# Patient Record
Sex: FEMALE
[De-identification: names, ages, dates, MRNs, and addresses within clinical notes are randomized per-mention and may not be internally consistent; named-entity substitution may affect disease eponyms.]

---

## 2019-01-16 ENCOUNTER — RX RENEWAL (OUTPATIENT)
Age: 44
End: 2019-01-16

## 2019-08-16 ENCOUNTER — RX RENEWAL (OUTPATIENT)
Age: 44
End: 2019-08-16

## 2019-08-21 ENCOUNTER — RX RENEWAL (OUTPATIENT)
Age: 44
End: 2019-08-21

## 2019-08-23 ENCOUNTER — RX RENEWAL (OUTPATIENT)
Age: 44
End: 2019-08-23

## 2019-08-26 ENCOUNTER — RX RENEWAL (OUTPATIENT)
Age: 44
End: 2019-08-26

## 2019-10-10 ENCOUNTER — RECORD ABSTRACTING (OUTPATIENT)
Age: 44
End: 2019-10-10

## 2019-10-10 DIAGNOSIS — Z83.3 FAMILY HISTORY OF DIABETES MELLITUS: ICD-10-CM

## 2019-10-10 DIAGNOSIS — Z82.49 FAMILY HISTORY OF ISCHEMIC HEART DISEASE AND OTHER DISEASES OF THE CIRCULATORY SYSTEM: ICD-10-CM

## 2019-10-10 LAB — CYTOLOGY CVX/VAG DOC THIN PREP: NORMAL

## 2019-12-04 ENCOUNTER — APPOINTMENT (OUTPATIENT)
Dept: INTERNAL MEDICINE | Facility: CLINIC | Age: 44
End: 2019-12-04
Payer: COMMERCIAL

## 2019-12-04 ENCOUNTER — NON-APPOINTMENT (OUTPATIENT)
Age: 44
End: 2019-12-04

## 2019-12-04 VITALS
HEIGHT: 63 IN | SYSTOLIC BLOOD PRESSURE: 120 MMHG | WEIGHT: 132 LBS | DIASTOLIC BLOOD PRESSURE: 74 MMHG | BODY MASS INDEX: 23.39 KG/M2

## 2019-12-04 DIAGNOSIS — Z12.31 ENCOUNTER FOR SCREENING MAMMOGRAM FOR MALIGNANT NEOPLASM OF BREAST: ICD-10-CM

## 2019-12-04 PROCEDURE — 36415 COLL VENOUS BLD VENIPUNCTURE: CPT

## 2019-12-04 PROCEDURE — 93000 ELECTROCARDIOGRAM COMPLETE: CPT

## 2019-12-04 PROCEDURE — 99396 PREV VISIT EST AGE 40-64: CPT | Mod: 25

## 2019-12-04 RX ORDER — LEVOTHYROXINE SODIUM 25 UG/1
25 TABLET ORAL
Refills: 0 | Status: DISCONTINUED | COMMUNITY
End: 2019-12-04

## 2019-12-04 RX ORDER — LEVOTHYROXINE SODIUM 0.03 MG/1
25 TABLET ORAL
Qty: 30 | Refills: 1 | Status: DISCONTINUED | COMMUNITY
Start: 2019-01-16 | End: 2019-12-04

## 2019-12-04 NOTE — HEALTH RISK ASSESSMENT
[Very Good] : ~his/her~  mood as very good [No falls in past year] : Patient reported no falls in the past year [No] : No [0] : 2) Feeling down, depressed, or hopeless: Not at all (0) [Patient reported mammogram was normal] : Patient reported mammogram was normal [Patient reported PAP Smear was normal] : Patient reported PAP Smear was normal [Patient declined bone density test] : Patient declined bone density test [Patient declined colonoscopy] : Patient declined colonoscopy [HIV Test offered] : HIV Test offered [Hepatitis C test declined] : Hepatitis C test declined [] : No [[id number]] : 0 [MammogramDate] : 07/17 [PapSmearDate] : 07/17

## 2019-12-04 NOTE — PLAN
[FreeTextEntry1] : 44-year-old female presents for annual exam, exercises regularly without difficulty, denies chest pain shortness of breath palpitations dizziness.\par \par Refuses flu vaccine\par \par Referred to gastroenterologist for discussion of colonoscopy\par \par Due for gynecologist and mammogram, prescription given\par \par Call one week to review labs

## 2019-12-04 NOTE — HISTORY OF PRESENT ILLNESS
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 Exercising regularly without difficulty, denies chest pain shortness breath palpitations dizziness.\par \par Concerned about colon cancer, good friend recently  at age 44, referred to gastroenterologist about need for colonoscopy.\par \par Due for a gynecologist and mammogram. [FreeTextEntry1] : Annual exam

## 2019-12-04 NOTE — PHYSICAL EXAM
[Normal Female:] : bladder was normal on palpation [Normal] : normal gait, coordination grossly intact, no focal deficits [de-identified] : Deferred GYN; Denies pain, lumps and discharge [de-identified] : No lymphadenopathy [FreeTextEntry1] : Deferred GI/GYN [de-identified] : Denies excessive thirst, urination, fatigue [de-identified] : No rash or skin lesion [de-identified] : Alert and Oriented x3.  Appropriate mood and affect

## 2019-12-05 LAB
ALBUMIN SERPL ELPH-MCNC: 4.8 G/DL
ALP BLD-CCNC: 54 U/L
ALT SERPL-CCNC: 23 U/L
ANION GAP SERPL CALC-SCNC: 16 MMOL/L
APPEARANCE: CLEAR
AST SERPL-CCNC: 29 U/L
BASOPHILS # BLD AUTO: 0.02 K/UL
BASOPHILS NFR BLD AUTO: 0.3 %
BILIRUB SERPL-MCNC: 0.4 MG/DL
BILIRUBIN URINE: NEGATIVE
BLOOD URINE: NORMAL
BUN SERPL-MCNC: 12 MG/DL
CALCIUM SERPL-MCNC: 10.2 MG/DL
CHLORIDE SERPL-SCNC: 101 MMOL/L
CHOLEST SERPL-MCNC: 251 MG/DL
CHOLEST/HDLC SERPL: 3.5 RATIO
CO2 SERPL-SCNC: 24 MMOL/L
COLOR: YELLOW
CREAT SERPL-MCNC: 0.73 MG/DL
EOSINOPHIL # BLD AUTO: 0.04 K/UL
EOSINOPHIL NFR BLD AUTO: 0.6 %
FOLATE SERPL-MCNC: 12.5 NG/ML
GLUCOSE QUALITATIVE U: NEGATIVE
GLUCOSE SERPL-MCNC: 93 MG/DL
HCT VFR BLD CALC: 42.9 %
HDLC SERPL-MCNC: 72 MG/DL
HGB BLD-MCNC: 14.5 G/DL
HIV1+2 AB SPEC QL IA.RAPID: NONREACTIVE
IMM GRANULOCYTES NFR BLD AUTO: 0.2 %
KETONES URINE: NEGATIVE
LDLC SERPL CALC-MCNC: 148 MG/DL
LEUKOCYTE ESTERASE URINE: NEGATIVE
LYMPHOCYTES # BLD AUTO: 1.4 K/UL
LYMPHOCYTES NFR BLD AUTO: 22.1 %
MAN DIFF?: NORMAL
MCHC RBC-ENTMCNC: 31.3 PG
MCHC RBC-ENTMCNC: 33.8 GM/DL
MCV RBC AUTO: 92.5 FL
MONOCYTES # BLD AUTO: 0.46 K/UL
MONOCYTES NFR BLD AUTO: 7.3 %
NEUTROPHILS # BLD AUTO: 4.41 K/UL
NEUTROPHILS NFR BLD AUTO: 69.5 %
NITRITE URINE: NEGATIVE
PH URINE: 7
PLATELET # BLD AUTO: 267 K/UL
POTASSIUM SERPL-SCNC: 4 MMOL/L
PROT SERPL-MCNC: 7.1 G/DL
PROTEIN URINE: NEGATIVE
RBC # BLD: 4.64 M/UL
RBC # FLD: 12.9 %
SODIUM SERPL-SCNC: 141 MMOL/L
SPECIFIC GRAVITY URINE: 1.01
T4 FREE SERPL-MCNC: 0.9 NG/DL
TRIGL SERPL-MCNC: 156 MG/DL
TSH SERPL-ACNC: 7.58 UIU/ML
UROBILINOGEN URINE: NORMAL
VIT B12 SERPL-MCNC: 1017 PG/ML
WBC # FLD AUTO: 6.34 K/UL

## 2021-01-21 ENCOUNTER — APPOINTMENT (OUTPATIENT)
Dept: INTERNAL MEDICINE | Facility: CLINIC | Age: 46
End: 2021-01-21
Payer: COMMERCIAL

## 2021-01-21 ENCOUNTER — LABORATORY RESULT (OUTPATIENT)
Age: 46
End: 2021-01-21

## 2021-01-21 VITALS
TEMPERATURE: 97.8 F | WEIGHT: 133 LBS | BODY MASS INDEX: 23.57 KG/M2 | DIASTOLIC BLOOD PRESSURE: 70 MMHG | SYSTOLIC BLOOD PRESSURE: 134 MMHG | HEIGHT: 63 IN

## 2021-01-21 DIAGNOSIS — Z00.00 ENCOUNTER FOR GENERAL ADULT MEDICAL EXAMINATION W/OUT ABNORMAL FINDINGS: ICD-10-CM

## 2021-01-21 DIAGNOSIS — G25.81 RESTLESS LEGS SYNDROME: ICD-10-CM

## 2021-01-21 PROCEDURE — 36415 COLL VENOUS BLD VENIPUNCTURE: CPT

## 2021-01-21 PROCEDURE — 99072 ADDL SUPL MATRL&STAF TM PHE: CPT

## 2021-01-21 PROCEDURE — 99396 PREV VISIT EST AGE 40-64: CPT | Mod: 25

## 2021-01-21 NOTE — PLAN
[FreeTextEntry1] : 45-year-old female as noted presents for annual exam, feels well. Denies chest pain shortness of breath palpitations dizziness\par Discussed diagnosis of restless leg syndrome and as noted patient refuses medication at this time\par \par Has an appointment next month with her endocrinologist for followup thyroid nodule.\par \par Scheduled for GYN and mammogram\par \par Referred to GI for colonoscopy, she will check with her insurance for coverage before age 50. No family history of colon cancer\par Urine culture done today\par Call next week to review labs

## 2021-01-21 NOTE — HEALTH RISK ASSESSMENT
[Very Good] : ~his/her~  mood as very good [No] : No [No falls in past year] : Patient reported no falls in the past year [0] : 2) Feeling down, depressed, or hopeless: Not at all (0) [Patient reported mammogram was normal] : Patient reported mammogram was normal [Patient reported PAP Smear was normal] : Patient reported PAP Smear was normal [Patient declined bone density test] : Patient declined bone density test [Patient declined colonoscopy] : Patient declined colonoscopy [HIV test declined] : HIV test declined [Hepatitis C test declined] : Hepatitis C test declined [] : No [[id number]] : 0 [MammogramDate] : 07/17 [MammogramComments] : Will follow up with Dr. Luna [PapSmearDate] : 07/17 [PapSmearComments] : Will follow up with Dr. Luna

## 2021-01-21 NOTE — HISTORY OF PRESENT ILLNESS
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 Exercises regularly without difficulty, denies chest pain shortness of breath palpitations dizziness.Only complaint is that she has a creepy crawly feeling in her left leg in the evening and at bedtime, not keeping her awake. Discussed symptoms at length as well as medication which at this time patient refuses\par Complaining of urinary frequency and urgency 2 days ago, symptoms have improved somewhat but patient is concerned about UTI\par Up to date with screenings

## 2021-01-21 NOTE — PHYSICAL EXAM
[Normal Female:] : bladder was normal on palpation [Normal] : normal gait, coordination grossly intact, no focal deficits [de-identified] : Deferred GYN; Denies pain, lumps and discharge [FreeTextEntry1] : Deferred GI/GYN [de-identified] : No lymphadenopathy [de-identified] : Denies excessive thirst, urination, fatigue [de-identified] : No rash or skin lesion [de-identified] : Alert and Oriented x3.  Appropriate mood and affect

## 2021-01-22 LAB
ALBUMIN SERPL ELPH-MCNC: 4.8 G/DL
ALP BLD-CCNC: 69 U/L
ALT SERPL-CCNC: 19 U/L
ANION GAP SERPL CALC-SCNC: 13 MMOL/L
APPEARANCE: CLEAR
AST SERPL-CCNC: 25 U/L
BASOPHILS # BLD AUTO: 0.02 K/UL
BASOPHILS NFR BLD AUTO: 0.4 %
BILIRUB SERPL-MCNC: 0.2 MG/DL
BILIRUBIN URINE: NEGATIVE
BLOOD URINE: NEGATIVE
BUN SERPL-MCNC: 13 MG/DL
CALCIUM SERPL-MCNC: 10.3 MG/DL
CHLORIDE SERPL-SCNC: 102 MMOL/L
CHOLEST SERPL-MCNC: 224 MG/DL
CO2 SERPL-SCNC: 27 MMOL/L
COLOR: COLORLESS
CREAT SERPL-MCNC: 0.83 MG/DL
EOSINOPHIL # BLD AUTO: 0.07 K/UL
EOSINOPHIL NFR BLD AUTO: 1.4 %
FOLATE SERPL-MCNC: 7.8 NG/ML
GLUCOSE QUALITATIVE U: NEGATIVE
GLUCOSE SERPL-MCNC: 76 MG/DL
HCT VFR BLD CALC: 43.1 %
HDLC SERPL-MCNC: 80 MG/DL
HGB BLD-MCNC: 13.9 G/DL
IMM GRANULOCYTES NFR BLD AUTO: 0.4 %
KETONES URINE: NEGATIVE
LDLC SERPL CALC-MCNC: 127 MG/DL
LEUKOCYTE ESTERASE URINE: NEGATIVE
LYMPHOCYTES # BLD AUTO: 1.34 K/UL
LYMPHOCYTES NFR BLD AUTO: 26.5 %
MAN DIFF?: NORMAL
MCHC RBC-ENTMCNC: 31.4 PG
MCHC RBC-ENTMCNC: 32.3 GM/DL
MCV RBC AUTO: 97.3 FL
MONOCYTES # BLD AUTO: 0.45 K/UL
MONOCYTES NFR BLD AUTO: 8.9 %
NEUTROPHILS # BLD AUTO: 3.16 K/UL
NEUTROPHILS NFR BLD AUTO: 62.4 %
NITRITE URINE: NEGATIVE
NONHDLC SERPL-MCNC: 144 MG/DL
PH URINE: 7
PLATELET # BLD AUTO: 287 K/UL
POTASSIUM SERPL-SCNC: 4 MMOL/L
PROT SERPL-MCNC: 7.1 G/DL
PROTEIN URINE: NEGATIVE
RBC # BLD: 4.43 M/UL
RBC # FLD: 13.2 %
SODIUM SERPL-SCNC: 142 MMOL/L
SPECIFIC GRAVITY URINE: 1
T4 FREE SERPL-MCNC: 0.9 NG/DL
TRIGL SERPL-MCNC: 83 MG/DL
TSH SERPL-ACNC: 5.2 UIU/ML
UROBILINOGEN URINE: NORMAL
VIT B12 SERPL-MCNC: 983 PG/ML
WBC # FLD AUTO: 5.06 K/UL

## 2021-01-23 LAB — BACTERIA UR CULT: NORMAL

## 2021-01-27 DIAGNOSIS — E03.9 HYPOTHYROIDISM, UNSPECIFIED: ICD-10-CM

## 2021-02-12 ENCOUNTER — TRANSCRIPTION ENCOUNTER (OUTPATIENT)
Age: 46
End: 2021-02-12

## 2021-02-16 ENCOUNTER — APPOINTMENT (OUTPATIENT)
Dept: OBGYN | Facility: CLINIC | Age: 46
End: 2021-02-16
Payer: COMMERCIAL

## 2021-02-16 VITALS
WEIGHT: 132 LBS | TEMPERATURE: 97.7 F | DIASTOLIC BLOOD PRESSURE: 60 MMHG | HEIGHT: 63 IN | BODY MASS INDEX: 23.39 KG/M2 | SYSTOLIC BLOOD PRESSURE: 138 MMHG

## 2021-02-16 DIAGNOSIS — R92.2 INCONCLUSIVE MAMMOGRAM: ICD-10-CM

## 2021-02-16 DIAGNOSIS — Z01.419 ENCOUNTER FOR GYNECOLOGICAL EXAMINATION (GENERAL) (ROUTINE) W/OUT ABNORMAL FINDINGS: ICD-10-CM

## 2021-02-16 DIAGNOSIS — Z12.11 ENCOUNTER FOR SCREENING FOR MALIGNANT NEOPLASM OF COLON: ICD-10-CM

## 2021-02-16 PROCEDURE — 99386 PREV VISIT NEW AGE 40-64: CPT

## 2021-02-16 PROCEDURE — 99072 ADDL SUPL MATRL&STAF TM PHE: CPT

## 2021-02-16 NOTE — HISTORY OF PRESENT ILLNESS
[TextBox_4] : 45 year old P1 for annual exam. Periods coming at somewhat irregular intervals, sometimes early and sometimes late. No complaints. Using condoms for contraception. No history of cervical dysplasia. Due for mammogram and colon ca screening.

## 2021-02-16 NOTE — PLAN
[FreeTextEntry1] : Rx mammogram, breast US and GI referral given. Pap done today. Return in one year or sooner PRN.

## 2021-02-23 LAB
CYTOLOGY CVX/VAG DOC THIN PREP: ABNORMAL
HPV HIGH+LOW RISK DNA PNL CVX: NOT DETECTED

## 2021-09-01 ENCOUNTER — NON-APPOINTMENT (OUTPATIENT)
Age: 46
End: 2021-09-01

## 2021-09-08 ENCOUNTER — APPOINTMENT (OUTPATIENT)
Dept: INTERNAL MEDICINE | Facility: CLINIC | Age: 46
End: 2021-09-08
Payer: COMMERCIAL

## 2021-09-08 VITALS
TEMPERATURE: 97.9 F | DIASTOLIC BLOOD PRESSURE: 70 MMHG | HEIGHT: 63 IN | WEIGHT: 130 LBS | BODY MASS INDEX: 23.04 KG/M2 | SYSTOLIC BLOOD PRESSURE: 100 MMHG

## 2021-09-08 DIAGNOSIS — R59.0 LOCALIZED ENLARGED LYMPH NODES: ICD-10-CM

## 2021-09-08 PROCEDURE — 99213 OFFICE O/P EST LOW 20 MIN: CPT

## 2021-09-08 NOTE — REVIEW OF SYSTEMS
[Swollen Glands] : swollen glands [Negative] : Psychiatric [Easy Bleeding] : no easy bleeding [Easy Bruising] : no easy bruising [de-identified] : Lump right axilla

## 2021-09-08 NOTE — HISTORY OF PRESENT ILLNESS
[FreeTextEntry8] : 46-year-old female presents complaining of feeling a lump that was slightly tender right axilla several days ago which appeared at about the time she got her period. No redness or drainage. Lump resolved in a couple of days and now she can't feel anything. Mammogram is up-to-date

## 2021-09-08 NOTE — REVIEW OF SYSTEMS
[Swollen Glands] : swollen glands [Negative] : Psychiatric [Easy Bleeding] : no easy bleeding [Easy Bruising] : no easy bruising [de-identified] : Lump right axilla

## 2022-03-31 ENCOUNTER — APPOINTMENT (OUTPATIENT)
Dept: INTERNAL MEDICINE | Facility: CLINIC | Age: 47
End: 2022-03-31
Payer: COMMERCIAL

## 2022-03-31 VITALS
SYSTOLIC BLOOD PRESSURE: 98 MMHG | TEMPERATURE: 98.2 F | BODY MASS INDEX: 23.04 KG/M2 | WEIGHT: 130 LBS | DIASTOLIC BLOOD PRESSURE: 70 MMHG | HEIGHT: 63 IN

## 2022-03-31 PROCEDURE — 99213 OFFICE O/P EST LOW 20 MIN: CPT | Mod: 25

## 2022-03-31 PROCEDURE — 36415 COLL VENOUS BLD VENIPUNCTURE: CPT

## 2022-03-31 RX ORDER — CHOLECALCIFEROL (VITAMIN D3) 1250 MCG
1.25 MG CAPSULE ORAL
Refills: 0 | Status: ACTIVE | COMMUNITY

## 2022-03-31 NOTE — REVIEW OF SYSTEMS
[Abdominal Pain] : abdominal pain [Negative] : Heme/Lymph [Nausea] : no nausea [Constipation] : no constipation [Diarrhea] : diarrhea [Vomiting] : no vomiting [Heartburn] : no heartburn [Melena] : no melena [FreeTextEntry7] : Right mid

## 2022-03-31 NOTE — PHYSICAL EXAM
[No Acute Distress] : no acute distress [Well Nourished] : well nourished [Well Developed] : well developed [Well-Appearing] : well-appearing [Normal Sclera/Conjunctiva] : normal sclera/conjunctiva [PERRL] : pupils equal round and reactive to light [EOMI] : extraocular movements intact [Normal Outer Ear/Nose] : the outer ears and nose were normal in appearance [Normal Oropharynx] : the oropharynx was normal [No JVD] : no jugular venous distention [No Lymphadenopathy] : no lymphadenopathy [Supple] : supple [Thyroid Normal, No Nodules] : the thyroid was normal and there were no nodules present [No Respiratory Distress] : no respiratory distress  [No Accessory Muscle Use] : no accessory muscle use [Clear to Auscultation] : lungs were clear to auscultation bilaterally [Normal Rate] : normal rate  [Regular Rhythm] : with a regular rhythm [Normal S1, S2] : normal S1 and S2 [No Murmur] : no murmur heard [No Carotid Bruits] : no carotid bruits [No Abdominal Bruit] : a ~M bruit was not heard ~T in the abdomen [No Varicosities] : no varicosities [Pedal Pulses Present] : the pedal pulses are present [No Edema] : there was no peripheral edema [No Palpable Aorta] : no palpable aorta [No Extremity Clubbing/Cyanosis] : no extremity clubbing/cyanosis [Soft] : abdomen soft [Non Tender] : non-tender [Non-distended] : non-distended [No Masses] : no abdominal mass palpated [No HSM] : no HSM [Normal Bowel Sounds] : normal bowel sounds [No Hernias] : no hernias [Normal Posterior Cervical Nodes] : no posterior cervical lymphadenopathy [Normal Anterior Cervical Nodes] : no anterior cervical lymphadenopathy [No CVA Tenderness] : no CVA  tenderness [No Spinal Tenderness] : no spinal tenderness [No Joint Swelling] : no joint swelling [Grossly Normal Strength/Tone] : grossly normal strength/tone [No Rash] : no rash [Coordination Grossly Intact] : coordination grossly intact [No Focal Deficits] : no focal deficits [Normal Gait] : normal gait [Deep Tendon Reflexes (DTR)] : deep tendon reflexes were 2+ and symmetric [Normal Affect] : the affect was normal [Normal Insight/Judgement] : insight and judgment were intact

## 2022-03-31 NOTE — PLAN
[FreeTextEntry1] : 46-year-old female presents with symptoms consistent with muscle strain.  Lab work will be done today, patient will follow up tomorrow and she will monitor her symptoms and refrain from straining her abs when lifting weights etc.

## 2022-03-31 NOTE — HISTORY OF PRESENT ILLNESS
[FreeTextEntry8] : 46-year-old female presents complaining of right mid abdominal pain 2 weeks.  She eats a very healthy diet exercises regularly, bowel movements are regular.  Denies any other signs symptoms i.e. nausea, reflux.  Appetite good.  She has been training for the physical fitness test for the [facility] office and symptoms are consistent with multiple but she is very concerned that something may be wrong, lab work will be done today

## 2022-04-01 LAB
ALBUMIN SERPL ELPH-MCNC: 4.5 G/DL
ALP BLD-CCNC: 49 U/L
ALT SERPL-CCNC: 16 U/L
AMYLASE/CREAT SERPL: 52 U/L
ANION GAP SERPL CALC-SCNC: 14 MMOL/L
AST SERPL-CCNC: 24 U/L
BASOPHILS # BLD AUTO: 0.03 K/UL
BASOPHILS NFR BLD AUTO: 0.4 %
BILIRUB SERPL-MCNC: 0.4 MG/DL
BUN SERPL-MCNC: 16 MG/DL
CALCIUM SERPL-MCNC: 10.1 MG/DL
CHLORIDE SERPL-SCNC: 103 MMOL/L
CO2 SERPL-SCNC: 26 MMOL/L
CREAT SERPL-MCNC: 0.69 MG/DL
EGFR: 108 ML/MIN/1.73M2
EOSINOPHIL # BLD AUTO: 0.04 K/UL
EOSINOPHIL NFR BLD AUTO: 0.5 %
GLUCOSE SERPL-MCNC: 83 MG/DL
HCT VFR BLD CALC: 41.5 %
HGB BLD-MCNC: 13.5 G/DL
IMM GRANULOCYTES NFR BLD AUTO: 0.3 %
LPL SERPL-CCNC: 27 U/L
LYMPHOCYTES # BLD AUTO: 1.56 K/UL
LYMPHOCYTES NFR BLD AUTO: 20.5 %
MAN DIFF?: NORMAL
MCHC RBC-ENTMCNC: 31.7 PG
MCHC RBC-ENTMCNC: 32.5 GM/DL
MCV RBC AUTO: 97.4 FL
MONOCYTES # BLD AUTO: 0.63 K/UL
MONOCYTES NFR BLD AUTO: 8.3 %
NEUTROPHILS # BLD AUTO: 5.34 K/UL
NEUTROPHILS NFR BLD AUTO: 70 %
PLATELET # BLD AUTO: 263 K/UL
POTASSIUM SERPL-SCNC: 3.7 MMOL/L
PROT SERPL-MCNC: 6.7 G/DL
RBC # BLD: 4.26 M/UL
RBC # FLD: 13 %
SODIUM SERPL-SCNC: 143 MMOL/L
WBC # FLD AUTO: 7.62 K/UL

## 2023-06-12 ENCOUNTER — APPOINTMENT (OUTPATIENT)
Dept: INTERNAL MEDICINE | Facility: CLINIC | Age: 48
End: 2023-06-12
Payer: COMMERCIAL

## 2023-06-12 ENCOUNTER — LABORATORY RESULT (OUTPATIENT)
Age: 48
End: 2023-06-12

## 2023-06-12 VITALS
SYSTOLIC BLOOD PRESSURE: 112 MMHG | WEIGHT: 131 LBS | RESPIRATION RATE: 16 BRPM | OXYGEN SATURATION: 97 % | BODY MASS INDEX: 23.21 KG/M2 | HEART RATE: 77 BPM | HEIGHT: 63 IN | DIASTOLIC BLOOD PRESSURE: 76 MMHG

## 2023-06-12 DIAGNOSIS — E04.1 NONTOXIC SINGLE THYROID NODULE: ICD-10-CM

## 2023-06-12 DIAGNOSIS — Z77.21 CONTACT WITH AND (SUSPECTED) EXPOSURE TO POTENTIALLY HAZARDOUS BODY FLUIDS: ICD-10-CM

## 2023-06-12 DIAGNOSIS — Z00.00 ENCOUNTER FOR GENERAL ADULT MEDICAL EXAMINATION W/OUT ABNORMAL FINDINGS: ICD-10-CM

## 2023-06-12 DIAGNOSIS — Z87.898 PERSONAL HISTORY OF OTHER SPECIFIED CONDITIONS: ICD-10-CM

## 2023-06-12 DIAGNOSIS — S39.011A STRAIN OF MUSCLE, FASCIA AND TENDON OF ABDOMEN, INITIAL ENCOUNTER: ICD-10-CM

## 2023-06-12 DIAGNOSIS — R01.1 CARDIAC MURMUR, UNSPECIFIED: ICD-10-CM

## 2023-06-12 DIAGNOSIS — Z79.899 OTHER LONG TERM (CURRENT) DRUG THERAPY: ICD-10-CM

## 2023-06-12 PROCEDURE — 99214 OFFICE O/P EST MOD 30 MIN: CPT | Mod: 25

## 2023-06-12 PROCEDURE — G0444 DEPRESSION SCREEN ANNUAL: CPT | Mod: 59

## 2023-06-12 PROCEDURE — 36415 COLL VENOUS BLD VENIPUNCTURE: CPT

## 2023-06-12 PROCEDURE — 99396 PREV VISIT EST AGE 40-64: CPT | Mod: 25

## 2023-06-12 NOTE — REVIEW OF SYSTEMS
[Fever] : no fever [Vision Problems] : no vision problems [Hearing Loss] : no hearing loss [Nasal Discharge] : no nasal discharge [Chest Pain] : no chest pain [Palpitations] : no palpitations [Shortness Of Breath] : no shortness of breath [Cough] : no cough [Wheezing] : no wheezing [Abdominal Pain] : no abdominal pain [Nausea] : no nausea [Constipation] : no constipation [Diarrhea] : diarrhea [Vomiting] : no vomiting [Heartburn] : no heartburn [Dysuria] : no dysuria [Hematuria] : no hematuria [Frequency] : no frequency [Dizziness] : no dizziness

## 2023-06-12 NOTE — HEALTH RISK ASSESSMENT
[Good] : ~his/her~  mood as  good [Yes] : Yes [Monthly or less (1 pt)] : Monthly or less (1 point) [1 or 2 (0 pts)] : 1 or 2 (0 points) [Never (0 pts)] : Never (0 points) [No] : In the past 12 months have you used drugs other than those required for medical reasons? No [No falls in past year] : Patient reported no falls in the past year [0] : 2) Feeling down, depressed, or hopeless: Not at all (0) [PHQ-2 Negative - No further assessment needed] : PHQ-2 Negative - No further assessment needed [HIV Test offered] : HIV Test offered [Hepatitis C test offered] : Hepatitis C test offered [Never] : Never [Patient reported mammogram was normal] : Patient reported mammogram was normal [Audit-CScore] : 1 [[id number]] : 0 [MammogramDate] : 07/17 [PapSmearDate] : 02/21 [ColonoscopyComments] : never

## 2023-06-12 NOTE — PHYSICAL EXAM
[No Acute Distress] : no acute distress [Normal Voice/Communication] : normal voice/communication [Normal Sclera/Conjunctiva] : normal sclera/conjunctiva [Normal Oropharynx] : the oropharynx was normal [Supple] : supple [Normal Rate] : normal rate  [Regular Rhythm] : with a regular rhythm [Normal S1, S2] : normal S1 and S2 [No Edema] : there was no peripheral edema [Normal Appearance] : normal in appearance [No Masses] : no palpable masses [No Nipple Discharge] : no nipple discharge [No Axillary Lymphadenopathy] : no axillary lymphadenopathy [Normal] : soft, non-tender, non-distended, no masses palpated, no HSM and normal bowel sounds [de-identified] : Bilateral shotty, nontender cervical adenopathy.  Thyroid does not appear enlarged no discrete nodules [de-identified] : Grade 3 out of 6 soft murmur at apex

## 2023-06-12 NOTE — HISTORY OF PRESENT ILLNESS
[Other: _____] : [unfilled] [FreeTextEntry1] : Pt is here for annual PE and to go over chronic medical conditions.\par Patient says she has no acute issues.  She exercises on a regular basis and has a fit test next week for the [employer].

## 2023-06-13 ENCOUNTER — TRANSCRIPTION ENCOUNTER (OUTPATIENT)
Age: 48
End: 2023-06-13

## 2023-06-13 LAB
25(OH)D3 SERPL-MCNC: 46.7 NG/ML
ALBUMIN SERPL ELPH-MCNC: 4.5 G/DL
ALP BLD-CCNC: 52 U/L
ALT SERPL-CCNC: 14 U/L
ANION GAP SERPL CALC-SCNC: 14 MMOL/L
AST SERPL-CCNC: 25 U/L
BILIRUB SERPL-MCNC: 0.5 MG/DL
BUN SERPL-MCNC: 16 MG/DL
CALCIUM SERPL-MCNC: 10.2 MG/DL
CHLORIDE SERPL-SCNC: 101 MMOL/L
CHOLEST SERPL-MCNC: 228 MG/DL
CO2 SERPL-SCNC: 24 MMOL/L
CREAT SERPL-MCNC: 0.74 MG/DL
EGFR: 99 ML/MIN/1.73M2
ESTIMATED AVERAGE GLUCOSE: 114 MG/DL
GLUCOSE SERPL-MCNC: 87 MG/DL
HBA1C MFR BLD HPLC: 5.6 %
HBV SURFACE AB SER QL: REACTIVE
HBV SURFACE AG SER QL: NONREACTIVE
HCV AB SER QL: NONREACTIVE
HCV S/CO RATIO: 0.07 S/CO
HDLC SERPL-MCNC: 81 MG/DL
LDLC SERPL CALC-MCNC: 128 MG/DL
NONHDLC SERPL-MCNC: 148 MG/DL
POTASSIUM SERPL-SCNC: 4.1 MMOL/L
PROT SERPL-MCNC: 6.6 G/DL
SODIUM SERPL-SCNC: 139 MMOL/L
T PALLIDUM AB SER QL IA: NEGATIVE
TRIGL SERPL-MCNC: 96 MG/DL
TSH SERPL-ACNC: 8.34 UIU/ML

## 2023-06-14 LAB
C TRACH RRNA SPEC QL NAA+PROBE: NOT DETECTED
N GONORRHOEA RRNA SPEC QL NAA+PROBE: NOT DETECTED
SOURCE AMPLIFICATION: NORMAL

## 2023-06-15 LAB
M TB IFN-G BLD-IMP: NEGATIVE
QUANTIFERON TB PLUS MITOGEN MINUS NIL: 2.12 IU/ML
QUANTIFERON TB PLUS NIL: 0.02 IU/ML
QUANTIFERON TB PLUS TB1 MINUS NIL: 0 IU/ML
QUANTIFERON TB PLUS TB2 MINUS NIL: 0 IU/ML